# Patient Record
Sex: FEMALE | Race: WHITE | NOT HISPANIC OR LATINO | Employment: UNEMPLOYED | URBAN - METROPOLITAN AREA
[De-identification: names, ages, dates, MRNs, and addresses within clinical notes are randomized per-mention and may not be internally consistent; named-entity substitution may affect disease eponyms.]

---

## 2022-02-28 ENCOUNTER — OFFICE VISIT (OUTPATIENT)
Dept: MEDICAL GROUP | Facility: MEDICAL CENTER | Age: 52
End: 2022-02-28
Attending: NURSE PRACTITIONER
Payer: MEDICAID

## 2022-02-28 VITALS
RESPIRATION RATE: 18 BRPM | HEIGHT: 63 IN | SYSTOLIC BLOOD PRESSURE: 120 MMHG | OXYGEN SATURATION: 97 % | WEIGHT: 246 LBS | HEART RATE: 78 BPM | DIASTOLIC BLOOD PRESSURE: 80 MMHG | TEMPERATURE: 98.7 F | BODY MASS INDEX: 43.59 KG/M2

## 2022-02-28 DIAGNOSIS — Z13.21 SCREENING FOR ENDOCRINE, NUTRITIONAL, METABOLIC AND IMMUNITY DISORDER: ICD-10-CM

## 2022-02-28 DIAGNOSIS — Z13.29 SCREENING FOR ENDOCRINE, NUTRITIONAL, METABOLIC AND IMMUNITY DISORDER: ICD-10-CM

## 2022-02-28 DIAGNOSIS — Z13.0 SCREENING FOR ENDOCRINE, NUTRITIONAL, METABOLIC AND IMMUNITY DISORDER: ICD-10-CM

## 2022-02-28 DIAGNOSIS — Z76.89 ENCOUNTER TO ESTABLISH CARE: ICD-10-CM

## 2022-02-28 DIAGNOSIS — Z13.228 SCREENING FOR ENDOCRINE, NUTRITIONAL, METABOLIC AND IMMUNITY DISORDER: ICD-10-CM

## 2022-02-28 DIAGNOSIS — Z12.31 ENCOUNTER FOR SCREENING MAMMOGRAM FOR MALIGNANT NEOPLASM OF BREAST: ICD-10-CM

## 2022-02-28 DIAGNOSIS — Z23 NEED FOR VACCINATION: ICD-10-CM

## 2022-02-28 PROCEDURE — 99386 PREV VISIT NEW AGE 40-64: CPT | Performed by: NURSE PRACTITIONER

## 2022-02-28 PROCEDURE — 90715 TDAP VACCINE 7 YRS/> IM: CPT

## 2022-02-28 PROCEDURE — 99213 OFFICE O/P EST LOW 20 MIN: CPT | Performed by: NURSE PRACTITIONER

## 2022-02-28 ASSESSMENT — PATIENT HEALTH QUESTIONNAIRE - PHQ9
5. POOR APPETITE OR OVEREATING: 1 - SEVERAL DAYS
SUM OF ALL RESPONSES TO PHQ QUESTIONS 1-9: 6
CLINICAL INTERPRETATION OF PHQ2 SCORE: 2

## 2022-02-28 NOTE — PROGRESS NOTES
"Chief Complaint   Patient presents with   • Establish Care       Subjective:     HPI:   Judi Mathur is a 51 y.o. female here to discuss the evaluation and management of:        Problem   Encounter to Establish Care    Patient here to establish care.  Has no significant complaints but states it has been a while since she has had a Pap smear completed.         ROS  See HPI       Allergies   Allergen Reactions   • Pcn [Penicillins] Anaphylaxis       Current medicines (including changes today)  No current outpatient medications on file.     No current facility-administered medications for this visit.       Social History     Tobacco Use   • Smoking status: Former Smoker     Packs/day: 1.00     Years: 6.00     Pack years: 6.00     Types: Cigarettes     Quit date: 1/1/2012     Years since quitting: 10.1   • Smokeless tobacco: Never Used   Vaping Use   • Vaping Use: Never used   Substance Use Topics   • Alcohol use: No   • Drug use: No       Patient Active Problem List    Diagnosis Date Noted   • Encounter to establish care 02/28/2022   • Complete rupture of rotator cuff 10/09/2012       Family History   Problem Relation Age of Onset   • Heart Disease Mother    • Alzheimer's Disease Maternal Grandmother           Objective:     /80   Pulse 78   Temp 37.1 °C (98.7 °F) (Temporal)   Resp 18   Ht 1.607 m (5' 3.25\")   Wt 112 kg (246 lb)   SpO2 97%  Body mass index is 43.23 kg/m².    Physical Exam:  Physical Exam  Vitals reviewed.   Constitutional:       General: She is awake.      Appearance: Normal appearance. She is well-developed. She is obese.   HENT:      Head: Normocephalic.      Nose: Nose normal.      Mouth/Throat:      Mouth: Mucous membranes are moist.      Pharynx: Oropharynx is clear. No oropharyngeal exudate.   Eyes:      Conjunctiva/sclera: Conjunctivae normal.      Pupils: Pupils are equal, round, and reactive to light.   Cardiovascular:      Rate and Rhythm: Normal rate and regular rhythm.      " Heart sounds: Normal heart sounds.   Pulmonary:      Effort: Pulmonary effort is normal. No respiratory distress.      Breath sounds: Normal breath sounds. No wheezing.   Musculoskeletal:      Cervical back: Neck supple. No tenderness.   Lymphadenopathy:      Cervical: No cervical adenopathy.   Skin:     General: Skin is warm and dry.   Neurological:      Mental Status: She is alert and oriented to person, place, and time.   Psychiatric:         Mood and Affect: Mood normal.         Behavior: Behavior normal. Behavior is cooperative.         Assessment and Plan:     The following treatment plan was discussed:    Problem List Items Addressed This Visit     Encounter to establish care     Discussed health history and maintenance   Flu vaccine - Declined   Colon Ca screening - Not applicable   Mammogram- Ordered   Pap smear - Scheduled in the next few weeks  STD Screening- Declined   Preventative screening labs ordered -  We will follow up in 4 weeks  Tetanus shot provided             Other Visit Diagnoses     Encounter for screening mammogram for malignant neoplasm of breast        Relevant Orders    MA-SCREENING MAMMO BILAT W/TOMOSYNTHESIS W/CAD    Need for vaccination        Relevant Orders    Tdap =>6yo IM (Completed)    Screening for endocrine, nutritional, metabolic and immunity disorder        Relevant Orders    HEMOGLOBIN A1C    Lipid Profile    TSH    VITAMIN D,25 HYDROXY    HEP C VIRUS ANTIBODY    FREE THYROXINE    Comp Metabolic Panel    CBC WITH DIFFERENTIAL          Any change or worsening of signs or symptoms, patient encouraged to follow-up or report to emergency room for further evaluation. Patient verbalizes understanding and agrees.    Follow-Up: Return in about 4 weeks (around 3/28/2022) for Pap, Follow up Labs.      PLEASE NOTE: This dictation was created using voice recognition software. I have made every reasonable attempt to correct obvious errors, but I expect that there are errors of grammar  and possibly content that I did not discover before finalizing the note.

## 2022-02-28 NOTE — ASSESSMENT & PLAN NOTE
Discussed health history and maintenance   Flu vaccine - Declined   Colon Ca screening - Not applicable   Mammogram- Ordered   Pap smear - Scheduled in the next few weeks  STD Screening- Declined   Preventative screening labs ordered -  We will follow up in 4 weeks  Tetanus shot provided

## 2022-03-16 ENCOUNTER — HOSPITAL ENCOUNTER (OUTPATIENT)
Facility: MEDICAL CENTER | Age: 52
End: 2022-03-16
Attending: NURSE PRACTITIONER
Payer: MEDICAID

## 2022-03-16 ENCOUNTER — OFFICE VISIT (OUTPATIENT)
Dept: MEDICAL GROUP | Facility: MEDICAL CENTER | Age: 52
End: 2022-03-16
Attending: NURSE PRACTITIONER
Payer: MEDICAID

## 2022-03-16 VITALS
RESPIRATION RATE: 14 BRPM | TEMPERATURE: 97.2 F | BODY MASS INDEX: 43.59 KG/M2 | WEIGHT: 246 LBS | HEIGHT: 63 IN | DIASTOLIC BLOOD PRESSURE: 72 MMHG | SYSTOLIC BLOOD PRESSURE: 118 MMHG | HEART RATE: 68 BPM

## 2022-03-16 DIAGNOSIS — Z12.4 SCREENING FOR CERVICAL CANCER: ICD-10-CM

## 2022-03-16 PROCEDURE — 88175 CYTOPATH C/V AUTO FLUID REDO: CPT

## 2022-03-16 PROCEDURE — G0101 CA SCREEN;PELVIC/BREAST EXAM: HCPCS | Mod: 25 | Performed by: NURSE PRACTITIONER

## 2022-03-16 PROCEDURE — 99213 OFFICE O/P EST LOW 20 MIN: CPT | Performed by: NURSE PRACTITIONER

## 2022-03-16 NOTE — PROCEDURES
cc: well exam    Subjective:     Judi Mathur is a 52 y.o. female presents for a routine preventive health exam.    Ob-Gyn/ History:    /Para:  3/3  Last Pap Smear:  >5 years. no history of abnormal pap smears.  Gyn Surgery:  c section.  Current Contraceptive Method:  Menopause . yes currently sexually active.  Last menstrual period:  5 years ago .  Periods stopped  Post-menopausal bleeding: no  Urinary incontinence: no       GYN/:  Abnormal bleeding:  Denies  Abnormal discharge: Denies  Painful intercourse: Denies      Screening for cervical cancer  Routine screening, Pap smear completed with no obvious abnormalities  We will send swab for pathology and further medical intervention based on results  See procedure note      Health Maintenance    Cholesterol Screening: Completed    Diabetes Screening: Completed  Aspirin Use: No    Diet: discussed previous visit   Exercise: walks daily several miles    Substance Abuse: no   Safe in relationship. yes  Seat belts, bike helmet, gun safety discussed.  Sun protection used.    Cancer screening  Colorectal Cancer Screening: Will be discussing next appointment     Lung Cancer Screening: n/a    Cervical Cancer Screening: completed today     Breast Cancer Screening: Mammogram ordered            Physical Exam  Vitals reviewed. Exam conducted with a chaperone present.   Constitutional:       General: She is awake.      Appearance: Normal appearance. She is well-developed.   HENT:      Head: Normocephalic.   Eyes:      Conjunctiva/sclera: Conjunctivae normal.   Cardiovascular:      Rate and Rhythm: Normal rate.   Pulmonary:      Effort: Pulmonary effort is normal. No respiratory distress.   Genitourinary:     Exam position: Lithotomy position.      Pubic Area: No rash.       Labia:         Right: No rash or tenderness.         Left: No rash or tenderness.       Vagina: Normal.      Cervix: Normal.   Musculoskeletal:      Cervical back: Neck supple.   Skin:     General:  Skin is warm and dry.   Neurological:      Mental Status: She is alert and oriented to person, place, and time.   Psychiatric:         Mood and Affect: Mood normal.         Behavior: Behavior normal. Behavior is cooperative.         Assessment and Plan:    1. Screening for cervical cancer  THINPREP PAP, REFLEX HPV ON ASC-US AND ABOVE         Follow up:

## 2022-03-16 NOTE — ASSESSMENT & PLAN NOTE
Routine screening, Pap smear completed with no obvious abnormalities  We will send swab for pathology and further medical intervention based on results  See procedure note

## 2022-03-16 NOTE — PROGRESS NOTES
"Chief Complaint   Patient presents with   • Gynecologic Exam       Subjective:     HPI:   Judi Mathur is a 52 y.o. female here to discuss the evaluation and management of:      Problem   Screening for Cervical Cancer    Patient here for well woman cervical cancer screening exam/Pap smear  Patient states of been greater than 10 years since she has had a previous Pap smear, has never been told of any abnormalities.  Is postmenopausal and has not had a period in about 5 years.  Is currently sexually active with monogamous partner         ROS  See HPI     Allergies   Allergen Reactions   • Pcn [Penicillins] Anaphylaxis       Current medicines (including changes today)  No current outpatient medications on file.     No current facility-administered medications for this visit.       Social History     Tobacco Use   • Smoking status: Former Smoker     Packs/day: 1.00     Years: 6.00     Pack years: 6.00     Types: Cigarettes     Quit date: 1/1/2012     Years since quitting: 10.2   • Smokeless tobacco: Never Used   Vaping Use   • Vaping Use: Never used   Substance Use Topics   • Alcohol use: No   • Drug use: No       Patient Active Problem List    Diagnosis Date Noted   • Screening for cervical cancer 03/16/2022   • Encounter to establish care 02/28/2022   • Complete rupture of rotator cuff 10/09/2012       Family History   Problem Relation Age of Onset   • Heart Disease Mother    • Alzheimer's Disease Maternal Grandmother           Objective:     /72 (BP Location: Left arm, Patient Position: Sitting, BP Cuff Size: Large adult)   Pulse 68   Temp 36.2 °C (97.2 °F) (Temporal)   Resp 14   Ht 1.607 m (5' 3.25\")   Wt 112 kg (246 lb)  Body mass index is 43.23 kg/m².    Physical Exam:  Physical Exam-  See procedure note          Assessment and Plan:     The following treatment plan was discussed:    Problem List Items Addressed This Visit     Screening for cervical cancer     Routine screening, Pap smear completed " with no obvious abnormalities  We will send swab for pathology and further medical intervention based on results  See procedure note         Relevant Orders    THINPREP PAP, REFLEX HPV ON ASC-US AND ABOVE          Any change or worsening of signs or symptoms, patient encouraged to follow-up or report to emergency room for further evaluation. Patient verbalizes understanding and agrees.    Follow-Up: Patient has follow-up for labs scheduled in about 2 days    PLEASE NOTE: This dictation was created using voice recognition software. I have made every reasonable attempt to correct obvious errors, but I expect that there are errors of grammar and possibly content that I did not discover before finalizing the note.

## 2022-03-17 DIAGNOSIS — Z12.4 SCREENING FOR CERVICAL CANCER: ICD-10-CM

## 2022-03-17 LAB — CYTOLOGY REG CYTOL: NORMAL

## 2022-05-13 ENCOUNTER — OFFICE VISIT (OUTPATIENT)
Dept: MEDICAL GROUP | Facility: MEDICAL CENTER | Age: 52
End: 2022-05-13
Attending: NURSE PRACTITIONER
Payer: MEDICAID

## 2022-05-13 ENCOUNTER — HOSPITAL ENCOUNTER (OUTPATIENT)
Facility: MEDICAL CENTER | Age: 52
End: 2022-05-13
Attending: NURSE PRACTITIONER
Payer: MEDICAID

## 2022-05-13 VITALS
HEART RATE: 88 BPM | OXYGEN SATURATION: 97 % | WEIGHT: 241.4 LBS | RESPIRATION RATE: 16 BRPM | SYSTOLIC BLOOD PRESSURE: 132 MMHG | BODY MASS INDEX: 42.77 KG/M2 | DIASTOLIC BLOOD PRESSURE: 82 MMHG | TEMPERATURE: 95.9 F | HEIGHT: 63 IN

## 2022-05-13 DIAGNOSIS — R68.89 FLU-LIKE SYMPTOMS: ICD-10-CM

## 2022-05-13 PROCEDURE — 99214 OFFICE O/P EST MOD 30 MIN: CPT | Performed by: NURSE PRACTITIONER

## 2022-05-13 PROCEDURE — 0240U HCHG SARS-COV-2 COVID-19 NFCT DS RESP RNA 3 TRGT MIC: CPT

## 2022-05-13 PROCEDURE — 99212 OFFICE O/P EST SF 10 MIN: CPT | Performed by: NURSE PRACTITIONER

## 2022-05-13 NOTE — PROGRESS NOTES
"Chief Complaint   Patient presents with   • Flu Like Symptoms       Subjective:     HPI:   Judi Mathur is a 52 y.o. female here to discuss the evaluation and management of:      Problem   Flu-Like Symptoms    Patient has been feeling poorly since Wednesday (3 days). She is having muscle aches, fevers, sore throat, cough, fatigue, violent sneezing, nasal congestion. Sore throat seems to have improved with gargling salt water and taking zicam. She has been taking OTC cold remedies without much relief. Has been eating oranges, chicken noodle soup and drinking lots of fluids.          ROS  See HPI     Allergies   Allergen Reactions   • Pcn [Penicillins] Anaphylaxis       Current medicines (including changes today)  No current outpatient medications on file.     No current facility-administered medications for this visit.       Social History     Tobacco Use   • Smoking status: Former Smoker     Packs/day: 1.00     Years: 6.00     Pack years: 6.00     Types: Cigarettes     Quit date: 1/1/2012     Years since quitting: 10.3   • Smokeless tobacco: Never Used   Vaping Use   • Vaping Use: Never used   Substance Use Topics   • Alcohol use: No   • Drug use: No       Patient Active Problem List    Diagnosis Date Noted   • Flu-like symptoms 05/13/2022   • Screening for cervical cancer 03/16/2022   • Encounter to establish care 02/28/2022   • Complete rupture of rotator cuff 10/09/2012       Family History   Problem Relation Age of Onset   • Heart Disease Mother    • Alzheimer's Disease Maternal Grandmother           Objective:     /82 (BP Location: Right arm, Patient Position: Sitting, BP Cuff Size: Adult)   Pulse 88   Temp (!) 35.5 °C (95.9 °F) (Temporal)   Resp 16   Ht 1.6 m (5' 3\")   Wt 109 kg (241 lb 6.4 oz)   SpO2 97%  Body mass index is 42.76 kg/m².    Physical Exam:  Physical Exam  Vitals reviewed.   Constitutional:       General: She is awake.      Appearance: She is well-developed. She is ill-appearing. "   HENT:      Head: Normocephalic.      Nose: Congestion and rhinorrhea present.      Mouth/Throat:      Mouth: Mucous membranes are moist.      Pharynx: Posterior oropharyngeal erythema present. No oropharyngeal exudate.   Eyes:      Conjunctiva/sclera: Conjunctivae normal.   Cardiovascular:      Rate and Rhythm: Normal rate.   Pulmonary:      Effort: Pulmonary effort is normal. No respiratory distress.   Musculoskeletal:      Cervical back: Neck supple. Tenderness present.   Lymphadenopathy:      Cervical: No cervical adenopathy.   Skin:     General: Skin is warm and dry.   Neurological:      Mental Status: She is alert and oriented to person, place, and time.   Psychiatric:         Mood and Affect: Mood normal.         Behavior: Behavior normal. Behavior is cooperative.              Assessment and Plan:     The following treatment plan was discussed:    Problem List Items Addressed This Visit     Flu-like symptoms     Acute-  POCT flu testing in clinic- was invalid  Will send Respiratory COVID/ FLU PCR to lab for testing and provide results through my chart    Discussed with patient about covid testing- she has had symptoms for 3 days and we would not have results until 48 hours from now when the CDC recommendations would clear her to return, she has decided to go home and quarantine for the next 48 hours to meet the CDC guidelines.   Return to work note provided as long as she is feeling better at end of quarantine period.  Continue with symptom management- increase fluids and manage fever with tylenol ibuprofen.   If patient continues to feel poorly on Monday, continues to have fevers or has increased shortness of breath she will return to clinic.   If she becomes severely worse and is having difficulty breathing or chest pain she will present to urgent care or the ER for evaluation.            Relevant Orders    CoV-2 and Flu A/B by PCR (24 hour In-House): Collect NP swab in V[facility]          Any change or worsening  of signs or symptoms, patient encouraged to follow-up or report to emergency room for further evaluation. Patient verbalizes understanding and agrees.    Follow-Up: As needed     PLEASE NOTE: This dictation was created using voice recognition software. I have made every reasonable attempt to correct obvious errors, but I expect that there are errors of grammar and possibly content that I did not discover before finalizing the note.

## 2022-05-13 NOTE — LETTER
May 13, 2022    To Whom It May Concern:         This is confirmation that Judi Mathur attended her scheduled appointment with BRYSON Carr on 5/13/22. She will need to remain at home and quarantined for the next 3 days. She may return to work on Monday, May 16th if she is feeling improved.        If you have any questions please do not hesitate to call me at the phone number listed below.    Sincerely,          SHANTEL CarrRZEV.  [phone]

## 2022-05-13 NOTE — ASSESSMENT & PLAN NOTE
Acute-  POCT flu testing in clinic- was invalid  Will send Respiratory COVID/ FLU PCR to lab for testing and provide results through my chart    Discussed with patient about covid testing- she has had symptoms for 3 days and we would not have results until 48 hours from now when the CDC recommendations would clear her to return, she has decided to go home and quarantine for the next 48 hours to meet the CDC guidelines.   Return to work note provided as long as she is feeling better at end of quarantine period.  Continue with symptom management- increase fluids and manage fever with tylenol ibuprofen.   If patient continues to feel poorly on Monday, continues to have fevers or has increased shortness of breath she will return to clinic.   If she becomes severely worse and is having difficulty breathing or chest pain she will present to urgent care or the ER for evaluation.

## 2022-05-14 LAB
FLUAV RNA SPEC QL NAA+PROBE: NEGATIVE
FLUBV RNA SPEC QL NAA+PROBE: NEGATIVE
SARS-COV-2 RNA RESP QL NAA+PROBE: NOTDETECTED
SPECIMEN SOURCE: NORMAL

## 2023-08-24 ENCOUNTER — PATIENT MESSAGE (OUTPATIENT)
Dept: HEALTH INFORMATION MANAGEMENT | Facility: OTHER | Age: 53
End: 2023-08-24